# Patient Record
Sex: MALE | Race: WHITE | NOT HISPANIC OR LATINO
[De-identification: names, ages, dates, MRNs, and addresses within clinical notes are randomized per-mention and may not be internally consistent; named-entity substitution may affect disease eponyms.]

---

## 2024-03-28 PROBLEM — Z00.00 ENCOUNTER FOR PREVENTIVE HEALTH EXAMINATION: Status: ACTIVE | Noted: 2024-03-28

## 2024-04-04 ENCOUNTER — NON-APPOINTMENT (OUTPATIENT)
Age: 59
End: 2024-04-04

## 2024-04-04 ENCOUNTER — APPOINTMENT (OUTPATIENT)
Dept: HEART AND VASCULAR | Facility: CLINIC | Age: 59
End: 2024-04-04
Payer: COMMERCIAL

## 2024-04-04 VITALS
SYSTOLIC BLOOD PRESSURE: 128 MMHG | TEMPERATURE: 97.9 F | HEIGHT: 73.5 IN | OXYGEN SATURATION: 99 % | BODY MASS INDEX: 24.26 KG/M2 | WEIGHT: 187 LBS | RESPIRATION RATE: 16 BRPM | HEART RATE: 49 BPM | DIASTOLIC BLOOD PRESSURE: 72 MMHG

## 2024-04-04 PROCEDURE — 93000 ELECTROCARDIOGRAM COMPLETE: CPT

## 2024-04-04 PROCEDURE — 99203 OFFICE O/P NEW LOW 30 MIN: CPT | Mod: 25

## 2024-04-04 RX ORDER — FLECAINIDE ACETATE 100 MG/1
100 TABLET ORAL
Qty: 180 | Refills: 3 | Status: ACTIVE | COMMUNITY
Start: 2024-04-04 | End: 1900-01-01

## 2024-04-04 NOTE — HISTORY OF PRESENT ILLNESS
[FreeTextEntry1] : Robi Griggs was seen today at the [facility]. Hei s a 58y.o.malehere for routine follow up. .He suffers from atrial fibrillation and atrial flutter. He transitioned from paroxysmal AF to persistent AF sometime in 2018.After reportinga change in his exercise tolerance and a feeling of fatigue he underwent DCCV with adjunctive Flecainide therapy in July 2022.Kardia monitor post cardioversion documented paroxysmal atrial fibrillation and atrial flutter with controlled ventricular rates. Today he reports that he has been in sinus rhythm on the Kardia monitor most of the time but has had some episodes of symptomatic AF.  He continues to take Flecainide and feels well. He recently underwent Lipoprotein a testing and will be seeing a general cardiologist for consultation in the coming weeks.  Past Medical History Vitiligo  No Known Allergies   Current Outpatient Medications  flecainide (TAMBOCOR)  100 mg, oral, every 12 hours   ECG sinus bradycardia normal intervals  HEENT- NCAT Constitutional: WN WD WF NAD Eyes: Sclera white. PERRLA. EOMI. ENMT: No obvious oral lesions. Oropharynx clear. No palpable neck masses. Extremities: No C/C/E. CV: No JVD or carotid bruit. PMI nl. S1S2 RRR No M/R/H/G Lungs: CTA & P Abd: +BS, NT/ND no HSM : Deferred Skin: no lesions or rash Neuro: A&Ox3, non focal, asymmetric smile (since birth) Psych: no abnl behaviors during exam  Exercise stress echocardiogram2/2023  negative for ischemia induced wall motion abnormality.  Stress ECG: Significant arrhythmia was seen, which consisted of transient second degree AV block mobitz I at recovery phase.  At peak stress ECG changes were seen, and consisted of ST depression in inferolateral leads. Return to baseline at the recovery phase.  Normal left ventricular wall motion and ejection fraction.  Mild right ventricular dilatation.  Normal right ventricular function.  Moderate tricuspid valve regurgitation.  Severe right atrial dilatation  LA dilatation LA size 5.2.  LVEF 60%  .

## 2024-04-04 NOTE — DISCUSSION/SUMMARY
[FreeTextEntry1] : Impression  1. Persistent Atrial fibrillation with worsening fatigue and worsening exercise tolerance, now in sinus rhythm status post DCCV on Flecainide therapy  2. CHADs Vasc = 0  3. Vitiligo  4. Elevated Lipoprotein a on screening  Plan  Mr. Griggs continues to do well on his current medical regimen. Review of recent echo demonstrates right atrial dilatation with mild to moderate TR. Will plan to repeat echo next visit. Follow up in six months. Follow up with general cardiology for elevated lipoprotein a.

## 2024-05-04 PROBLEM — I48.0 PAROXYSMAL ATRIAL FIBRILLATION: Status: RESOLVED | Noted: 2024-04-04 | Resolved: 2024-05-04

## 2024-05-08 ENCOUNTER — APPOINTMENT (OUTPATIENT)
Dept: HEART AND VASCULAR | Facility: CLINIC | Age: 59
End: 2024-05-08
Payer: COMMERCIAL

## 2024-05-08 ENCOUNTER — NON-APPOINTMENT (OUTPATIENT)
Age: 59
End: 2024-05-08

## 2024-05-08 VITALS
WEIGHT: 186 LBS | HEIGHT: 73.5 IN | OXYGEN SATURATION: 97 % | SYSTOLIC BLOOD PRESSURE: 118 MMHG | BODY MASS INDEX: 24.13 KG/M2 | HEART RATE: 58 BPM | DIASTOLIC BLOOD PRESSURE: 71 MMHG

## 2024-05-08 DIAGNOSIS — Z78.9 OTHER SPECIFIED HEALTH STATUS: ICD-10-CM

## 2024-05-08 DIAGNOSIS — I48.0 PAROXYSMAL ATRIAL FIBRILLATION: ICD-10-CM

## 2024-05-08 DIAGNOSIS — R06.09 OTHER FORMS OF DYSPNEA: ICD-10-CM

## 2024-05-08 DIAGNOSIS — E78.41 ELEVATED LIPOPROTEIN(A): ICD-10-CM

## 2024-05-08 DIAGNOSIS — I48.91 UNSPECIFIED ATRIAL FIBRILLATION: ICD-10-CM

## 2024-05-08 PROCEDURE — 99205 OFFICE O/P NEW HI 60 MIN: CPT

## 2024-05-08 PROCEDURE — G2211 COMPLEX E/M VISIT ADD ON: CPT

## 2024-05-08 PROCEDURE — 93000 ELECTROCARDIOGRAM COMPLETE: CPT

## 2024-05-09 ENCOUNTER — TRANSCRIPTION ENCOUNTER (OUTPATIENT)
Age: 59
End: 2024-05-09

## 2024-05-09 LAB
ALBUMIN SERPL ELPH-MCNC: 4.7 G/DL
ALP BLD-CCNC: 78 U/L
ALT SERPL-CCNC: 20 U/L
ANION GAP SERPL CALC-SCNC: 11 MMOL/L
AST SERPL-CCNC: 22 U/L
BASOPHILS # BLD AUTO: 0.07 K/UL
BASOPHILS NFR BLD AUTO: 1.2 %
BILIRUB SERPL-MCNC: 0.8 MG/DL
BUN SERPL-MCNC: 20 MG/DL
CALCIUM SERPL-MCNC: 10 MG/DL
CHLORIDE SERPL-SCNC: 103 MMOL/L
CHOLEST SERPL-MCNC: 224 MG/DL
CO2 SERPL-SCNC: 27 MMOL/L
CREAT SERPL-MCNC: 0.97 MG/DL
EGFR: 90 ML/MIN/1.73M2
EOSINOPHIL # BLD AUTO: 0.07 K/UL
EOSINOPHIL NFR BLD AUTO: 1.2 %
ESTIMATED AVERAGE GLUCOSE: 103 MG/DL
GLUCOSE SERPL-MCNC: 92 MG/DL
HBA1C MFR BLD HPLC: 5.2 %
HCT VFR BLD CALC: 45.2 %
HDLC SERPL-MCNC: 91 MG/DL
HGB BLD-MCNC: 15 G/DL
IMM GRANULOCYTES NFR BLD AUTO: 0.3 %
LDLC SERPL DIRECT ASSAY-MCNC: 120 MG/DL
LYMPHOCYTES # BLD AUTO: 1.63 K/UL
LYMPHOCYTES NFR BLD AUTO: 26.9 %
MAN DIFF?: NORMAL
MCHC RBC-ENTMCNC: 32.6 PG
MCHC RBC-ENTMCNC: 33.2 GM/DL
MCV RBC AUTO: 98.3 FL
MONOCYTES # BLD AUTO: 0.53 K/UL
MONOCYTES NFR BLD AUTO: 8.7 %
NEUTROPHILS # BLD AUTO: 3.74 K/UL
NEUTROPHILS NFR BLD AUTO: 61.7 %
PLATELET # BLD AUTO: 196 K/UL
POTASSIUM SERPL-SCNC: 5 MMOL/L
PROT SERPL-MCNC: 6.6 G/DL
RBC # BLD: 4.6 M/UL
RBC # FLD: 13.4 %
SODIUM SERPL-SCNC: 140 MMOL/L
TRIGL SERPL-MCNC: 125 MG/DL
TSH SERPL-ACNC: 0.69 UIU/ML
WBC # FLD AUTO: 6.06 K/UL

## 2024-05-10 ENCOUNTER — TRANSCRIPTION ENCOUNTER (OUTPATIENT)
Age: 59
End: 2024-05-10

## 2024-05-10 LAB — APO LP(A) SERPL-MCNC: 140.5 NMOL/L

## 2024-05-14 ENCOUNTER — TRANSCRIPTION ENCOUNTER (OUTPATIENT)
Age: 59
End: 2024-05-14

## 2024-05-21 ENCOUNTER — RESULT REVIEW (OUTPATIENT)
Age: 59
End: 2024-05-21

## 2024-05-22 ENCOUNTER — NON-APPOINTMENT (OUTPATIENT)
Age: 59
End: 2024-05-22

## 2024-05-22 NOTE — HISTORY OF PRESENT ILLNESS
[FreeTextEntry1] : Mr. Griggs presents for initial evaluation and management of paroxysmal atrial fibrillation, dyslipidemia with elevated Lp(a), and vitiligo.  He is following with a heart rhythm specialist, Chad Wang MD, and is pursuing a rhythm control strategy.  He underwent DCCV in July 2022 and is being maintained on flecainide. He has not been maintained on systemic anticoagulation.   He monitors his rhythm with a portable cardiac rhythm device (AliveCor by [employer]) and notes brief, infrequent paroxysms of atrial fibrillation.  In February 2024, he had lab work and was noted to have an elevated lipoprotein a of 135.  We had an extensive discussion about the elevated Lp(a) result and its relation to ASCVD and stroke. I explained the need for screening first degree relatives for elevated Lp(a) given its genetic etiology and for pursuing optimal LDL targets. I also explained that, at this time, the only commercially available agents with an effect on lowering Lp(a) are the PCSK9 inhibitors.  He feels generally well with complaints of occasional palpitations and HERNANDES to multiple flights of stairs. On 05/21/24, he had a CCTA which revealed a CAC 25 (LAD 25), mLAD mild-to-moderate stenosis, left breast retroaerolar surgical clip, and hiatal hernia.

## 2024-05-22 NOTE — REASON FOR VISIT
[Other: ____] : [unfilled] [FreeTextEntry1] : ======================================================================= Diagnostic Tests: -------------------------------------------------------------- EC24: sinus bradycardia at 47 bpm, first degree AV block.  24: sinus bradycardia at 47 bpm, inferolateral early repolarization abnormalities.  -------------------------------------------------------------- Stress tests: 23: exercise stress echo: EF 60%, normal wall motion, PA pressures, and ECG post 10.1 METs of exercise. -------------------------------------------------------------- EP procedures: 22: DCCV from AF to SR.  -------------------------------------------------------------- CT: 24: CCTA: CAC 25 (LAD 25), mLAD mild-to-moderate stenosis, left breast retroaerolar surgical clip, hiatal hernia.

## 2024-05-22 NOTE — ASSESSMENT
[FreeTextEntry1] : ======================================================================================= 1. Atrial fibrillation, paroxysmal: [id number] Score: [id number] Score 0:    - follow up with heart rhythm specialist, Chad Wang MD    - continue to pursue rhythm control strategy with flecainide 100mg po bid    - there is no indication for systemic anticoagulation at this time      - we discussed the relationship of alcohol to atrial fibrillation burden, highlighting the correlation down to zero intake    - he will provide me with the report from his prior home sleep study  2. Dyslipidemia: elevated Lp(a):    - will initiate Repatha 140mg sc q 2 week given a 15-20% possible reduction in Lp(a)    - check lab work today    - will initiate targeted therapy against Lp(a) when available (pelacarsen, olpasiran, or zerlasiran)  3. CAD: s/p 05/21/24: CCTA: CAC 25 (LAD 25), mLAD mild-to-moderate stenosis, left breast retroaerolar surgical clip, hiatal hernia:    - will pursue aggressive medical management    - will not initiate antiplatelet therapy secondary to active GERD and esophagitis   I spent > 45 minutes of total time on this visit, including time spent face-to-face and non-face-to-face.  During this time, I took a relevant history and examined the patient.  I reviewed relevant portions of the medical record and formulated a differential diagnosis and plan.  I explained the relevant cardiac diagnoses to the patient, as well as the work up and management plan.  I answered all questions related to the patient's cardiac conditions.

## 2024-05-23 ENCOUNTER — RESULT REVIEW (OUTPATIENT)
Age: 59
End: 2024-05-23

## 2024-05-23 DIAGNOSIS — E78.5 HYPERLIPIDEMIA, UNSPECIFIED: ICD-10-CM

## 2024-05-23 RX ORDER — ROSUVASTATIN CALCIUM 10 MG/1
10 TABLET, FILM COATED ORAL DAILY
Qty: 90 | Refills: 3 | Status: ACTIVE | COMMUNITY
Start: 2024-05-23 | End: 1900-01-01

## 2024-05-23 NOTE — REASON FOR VISIT
[Other: ____] : [unfilled] [FreeTextEntry1] : ======================================================================= Diagnostic Tests: -------------------------------------------------------------- EC24: sinus bradycardia at 47 bpm, first degree AV block.  24: sinus bradycardia at 47 bpm, inferolateral early repolarization abnormalities.  -------------------------------------------------------------- Stress tests: 23: exercise stress echo: EF 60%, normal wall motion, PA pressures, and ECG post 10.1 METs of exercise. -------------------------------------------------------------- EP procedures: 22: DCCV from AF to SR.

## 2024-05-23 NOTE — HISTORY OF PRESENT ILLNESS
[FreeTextEntry1] : Mr. Griggs presents for initial evaluation and management of paroxysmal atrial fibrillation, dyslipidemia with elevated Lp(a), and vitiligo.  He is following with a heart rhythm specialist, Chad Wang MD, and is pursuing a rhythm control strategy.  He underwent DCCV in July 2022 and is being maintained on flecainide. He has not been maintained on systemic anticoagulation.   He monitors his rhythm with a portable cardiac rhythm device (AliveCor by [employer]) and notes brief, infrequent paroxysms of atrial fibrillation.  In February 2024, he had lab work and was noted to have an elevated lipoprotein a of 135.  We had an extensive discussion about the elevated Lp(a) result and its relation to ASCVD and stroke. I explained the need for screening first degree relatives for elevated Lp(a) given its genetic etiology and for pursuing optimal LDL targets. I also explained that, at this time, the only commercially available agents with an effect on lowering Lp(a) are the PCSK9 inhibitors.  He feels generally well with complaints of occasional palpitations and HERNANDES to multiple flights of stairs.

## 2024-06-26 ENCOUNTER — TRANSCRIPTION ENCOUNTER (OUTPATIENT)
Age: 59
End: 2024-06-26

## 2024-06-26 RX ORDER — EVOLOCUMAB 140 MG/ML
140 INJECTION, SOLUTION SUBCUTANEOUS
Qty: 6 | Refills: 3 | Status: ACTIVE | COMMUNITY
Start: 2024-05-08 | End: 1900-01-01

## 2024-09-25 ENCOUNTER — NON-APPOINTMENT (OUTPATIENT)
Age: 59
End: 2024-09-25

## 2024-09-25 ENCOUNTER — APPOINTMENT (OUTPATIENT)
Dept: HEART AND VASCULAR | Facility: CLINIC | Age: 59
End: 2024-09-25
Payer: COMMERCIAL

## 2024-09-25 VITALS
HEART RATE: 58 BPM | DIASTOLIC BLOOD PRESSURE: 72 MMHG | SYSTOLIC BLOOD PRESSURE: 107 MMHG | HEIGHT: 73.5 IN | BODY MASS INDEX: 24.95 KG/M2 | OXYGEN SATURATION: 97 % | WEIGHT: 192.38 LBS

## 2024-09-25 DIAGNOSIS — E78.41 ELEVATED LIPOPROTEIN(A): ICD-10-CM

## 2024-09-25 DIAGNOSIS — I25.10 ATHEROSCLEROTIC HEART DISEASE OF NATIVE CORONARY ARTERY W/OUT ANGINA PECTORIS: ICD-10-CM

## 2024-09-25 DIAGNOSIS — R06.09 OTHER FORMS OF DYSPNEA: ICD-10-CM

## 2024-09-25 DIAGNOSIS — I48.91 UNSPECIFIED ATRIAL FIBRILLATION: ICD-10-CM

## 2024-09-25 PROCEDURE — G2211 COMPLEX E/M VISIT ADD ON: CPT | Mod: NC

## 2024-09-25 PROCEDURE — 99214 OFFICE O/P EST MOD 30 MIN: CPT

## 2024-09-25 PROCEDURE — 93000 ELECTROCARDIOGRAM COMPLETE: CPT

## 2024-09-25 NOTE — HISTORY OF PRESENT ILLNESS
[FreeTextEntry1] : Mr. Griggs presents for follow up and management of CAD, paroxysmal atrial fibrillation, dyslipidemia with elevated Lp(a), and vitiligo.  He is following with a heart rhythm specialist, Chad Wang MD, and is pursuing a rhythm control strategy.  He underwent DCCV in July 2022 and is being maintained on flecainide. He has not been maintained on systemic anticoagulation.   He monitors his rhythm with a portable cardiac rhythm device (AliveCor by [employer]) and notes brief, infrequent paroxysms of atrial fibrillation.  In February 2024, he had lab work and was noted to have an elevated lipoprotein a of 135.  We had an extensive discussion about the elevated Lp(a) result and its relation to ASCVD and stroke. I explained the need for screening first degree relatives for elevated Lp(a) given its genetic etiology and for pursuing optimal LDL targets. I also explained that, at this time, the only commercially available agents with an effect on lowering Lp(a) are the PCSK9 inhibitors.  He feels generally well with complaints of occasional palpitations and HERNANDES to multiple flights of stairs. On 05/21/24, he had a CCTA which revealed a CAC score of 25 (LAD 25), mLAD mild-to-moderate stenosis, left breast retroaerolar surgical clip, and hiatal hernia.  He is tolerating Repatha well and stopped his rosuvastatin.  He will be traveling to [facility] in two weeks.

## 2024-09-25 NOTE — ASSESSMENT
[FreeTextEntry1] : ======================================================================================= 1. Atrial fibrillation, paroxysmal: [id number] Score: [id number] Score 0:    - follow up with heart rhythm specialist, Chad Wang MD    - continue to pursue rhythm control strategy with flecainide 100mg po bid    - there is no indication for systemic anticoagulation at this time      - we discussed the relationship of alcohol to atrial fibrillation burden, highlighting the correlation down to zero intake    - he will provide me with the report from his prior home sleep study  2. Dyslipidemia: elevated Lp(a) 140 nmol/liter (05/08/24),  (05/08/24):     - consider reinitiating rosuvastatin 10mg po qd post review of today's lab work     - continue Repatha 140mg sc q 2 weeks (employed for its effect on LP(a))    - check lab work today    - will initiate targeted therapy against Lp(a) when available (pelacarsen, olpasiran, or zerlasiran)  3. CAD: s/p 05/21/24: CCTA: CAC 25 (LAD 25), mLAD mild-to-moderate stenosis, left breast retroaerolar surgical clip, hiatal hernia:    - will pursue aggressive medical management    - will not initiate antiplatelet therapy secondary to active GERD and esophagitis    - will follow left ventricular function with periodic echocardiograms

## 2024-09-25 NOTE — HISTORY OF PRESENT ILLNESS
[FreeTextEntry1] : Mr. Griggs presents for follow up and management of CAD, paroxysmal atrial fibrillation, dyslipidemia with elevated Lp(a), and vitiligo.  He is following with a heart rhythm specialist, Chad Wang MD, and is pursuing a rhythm control strategy.  He underwent DCCV in July 2022 and is being maintained on flecainide. He has not been maintained on systemic anticoagulation.   He monitors his rhythm with a portable cardiac rhythm device (AliveCor by [employer]) and notes brief, infrequent paroxysms of atrial fibrillation.  In February 2024, he had lab work and was noted to have an elevated lipoprotein a of 135.  We had an extensive discussion about the elevated Lp(a) result and its relation to ASCVD and stroke. I explained the need for screening first degree relatives for elevated Lp(a) given its genetic etiology and for pursuing optimal LDL targets. I also explained that, at this time, the only commercially available agents with an effect on lowering Lp(a) are the PCSK9 inhibitors.  He feels generally well with complaints of occasional palpitations and HERNANDES to multiple flights of stairs. On 05/21/24, he had a CCTA which revealed a CAC score of 25 (LAD 25), mLAD mild-to-moderate stenosis, left breast retroaerolar surgical clip, and hiatal hernia.  He is tolerating Repatha well and stopped his rosuvastatin.  He will be traveling to [facility] in two weeks.

## 2024-09-25 NOTE — REASON FOR VISIT
[FreeTextEntry1] : ======================================================================= Diagnostic Tests: -------------------------------------------------------------- EC24: sinus bradycardia at 56 bpm, first degree AV block.  24: sinus bradycardia at 47 bpm, first degree AV block.  24: sinus bradycardia at 47 bpm, inferolateral early repolarization abnormalities.  -------------------------------------------------------------- Stress tests: 23: exercise stress echo: EF 60%, normal wall motion, PA pressures, and ECG post 10.1 METs of exercise. -------------------------------------------------------------- EP procedures: 22: DCCV from AF to SR.  -------------------------------------------------------------- CT: 24: CCTA: CAC 25 (LAD 25), mLAD mild-to-moderate stenosis, left breast retroaerolar surgical clip, hiatal hernia.

## 2024-09-25 NOTE — ASSESSMENT
[FreeTextEntry1] : ======================================================================================= 1. Atrial fibrillation, paroxysmal: [id number] Score: [id number] Score 0:    - follow up with heart rhythm specialist, Chad Wang MD    - continue to pursue rhythm control strategy with flecainide 100mg po bid    - there is no indication for systemic anticoagulation at this time      - we discussed the relationship of alcohol to atrial fibrillation burden, highlighting the correlation down to zero intake    - he will provide me with the report from his prior home sleep study  2. Dyslipidemia: elevated Lp(a) 140 nmol/liter (05/08/24),  (05/08/24):     - consider reinitiating rosuvastatin 10mg po qd post review of today's lab work     - continue Repatha 140mg sc q 2 weeks (employed for its effect on LP(a))    - check lab work today    - will initiate targeted therapy against Lp(a) when available (pelacarsen, olpasiran, or zerlasiran)  3. CAD: s/p 05/21/24: CCTA: CAC 25 (LAD 25), mLAD mild-to-moderate stenosis, left breast retroaerolar surgical clip, hiatal hernia:    - will pursue aggressive medical management    - will not initiate antiplatelet therapy secondary to active GERD and esophagitis    - will follow left ventricular function with periodic echocardiograms

## 2024-09-26 ENCOUNTER — APPOINTMENT (OUTPATIENT)
Dept: HEART AND VASCULAR | Facility: CLINIC | Age: 59
End: 2024-09-26
Payer: COMMERCIAL

## 2024-09-26 VITALS
HEIGHT: 72.05 IN | BODY MASS INDEX: 26.01 KG/M2 | HEART RATE: 54 BPM | RESPIRATION RATE: 16 BRPM | WEIGHT: 192 LBS | OXYGEN SATURATION: 97 % | TEMPERATURE: 97.9 F | SYSTOLIC BLOOD PRESSURE: 108 MMHG | DIASTOLIC BLOOD PRESSURE: 72 MMHG

## 2024-09-26 PROCEDURE — G2211 COMPLEX E/M VISIT ADD ON: CPT | Mod: NC

## 2024-09-26 PROCEDURE — 99214 OFFICE O/P EST MOD 30 MIN: CPT

## 2024-09-26 NOTE — HISTORY OF PRESENT ILLNESS
[FreeTextEntry1] : Robi Griggs was seen today at the [facility]. Hei s a 58 y.o.gentleman here for routine follow up. .He suffers from atrial fibrillation and atrial flutter. He transitioned from paroxysmal AF to persistent AF sometime in 2018.After reporting a change in his exercise tolerance and a feeling of fatigue he underwent DCCV with adjunctive Flecainide therapy in July 2022.  Kardia monitor post cardioversion documented paroxysmal atrial fibrillation and atrial flutter with controlled ventricular rates. Today he reports an increase in frequency in his episodes.  He recently underwent left knee meniscus repair and since surgery he has noted more symptoms.  Today he feels well.   Past Medical History Elevated Lipoprotein a, CT Calcium score of 25  Vitiligo  No Known Allergies   Current Outpatient Medications  flecainide (TAMBOCOR)  100 mg, oral, every 12 hours  Repatha 140mg/ml every two weeks  HEENT- NCAT Constitutional: WN WD WF NAD Eyes: Sclera white. PERRLA. EOMI. ENMT: No obvious oral lesions. Oropharynx clear. No palpable neck masses. Extremities: No C/C/E. CV: No JVD or carotid bruit. PMI nl. S1S2 RRR No M/R/H/G Lungs: CTA & P Abd: +BS, NT/ND no HSM : Deferred Skin: no lesions or rash Neuro: A&Ox3, non focal, asymmetric smile (since birth) Psych: no abnl behaviors during exam  ECG sinus bradycardia normal intervals  CTA with contrast- no coronary artery obstructions.  CAC score 25.  Exercise stress echocardiogram2/2023  negative for ischemia induced wall motion abnormality.  Stress ECG: Significant arrhythmia was seen, which consisted of transient second degree AV block mobitz I at recovery phase.  At peak stress ECG changes were seen, and consisted of ST depression in inferolateral leads. Return to baseline at the recovery phase.  Normal left ventricular wall motion and ejection fraction.  Mild right ventricular dilatation.  Normal right ventricular function.  Moderate tricuspid valve regurgitation.  Severe right atrial dilatation  LA dilatation LA size 5.2.  LVEF 60%  .

## 2024-09-26 NOTE — DISCUSSION/SUMMARY
[EKG obtained to assist in diagnosis and management of assessed problem(s)] : EKG obtained to assist in diagnosis and management of assessed problem(s) [FreeTextEntry1] : Impression  1. History of persistent Atrial fibrillation with worsening fatigue and worsening exercise tolerance, now in sinus rhythm status post DCCV on Flecainide therapy with increased AF burden following knee surgery 2. CHADs Vasc = 0  3. Vitiligo  4. Elevated Lipoprotein  a  now on PCSK9 5. RA dilatation and moderate TR   Plan  Mr. Griggs continues to do well on his current medical regimen but notes an increased frequency in AF episodes.   I reviewed the process of  left atrial catheter ablation with Mr SEAY and his wife.   We have discussed this several times in the past and he would like to hold off on any intervention at this time.   Will order TTE to assess tricuspid valve and RA dilatation.  Follow up in January or sooner PRN

## 2024-09-27 LAB
ALBUMIN SERPL ELPH-MCNC: 4.5 G/DL
ALP BLD-CCNC: 77 U/L
ALT SERPL-CCNC: 17 U/L
ANION GAP SERPL CALC-SCNC: 15 MMOL/L
APO LP(A) SERPL-MCNC: 110.5 NMOL/L
AST SERPL-CCNC: 22 U/L
BASOPHILS # BLD AUTO: 0.05 K/UL
BASOPHILS NFR BLD AUTO: 1 %
BILIRUB SERPL-MCNC: 0.5 MG/DL
BUN SERPL-MCNC: 17 MG/DL
CALCIUM SERPL-MCNC: 9.4 MG/DL
CHLORIDE SERPL-SCNC: 105 MMOL/L
CHOLEST SERPL-MCNC: 150 MG/DL
CO2 SERPL-SCNC: 22 MMOL/L
CREAT SERPL-MCNC: 0.89 MG/DL
EGFR: 99 ML/MIN/1.73M2
EOSINOPHIL # BLD AUTO: 0.05 K/UL
EOSINOPHIL NFR BLD AUTO: 1 %
ESTIMATED AVERAGE GLUCOSE: 100 MG/DL
GLUCOSE SERPL-MCNC: 96 MG/DL
HBA1C MFR BLD HPLC: 5.1 %
HCT VFR BLD CALC: 45.1 %
HDLC SERPL-MCNC: 83 MG/DL
HGB BLD-MCNC: 14.6 G/DL
IMM GRANULOCYTES NFR BLD AUTO: 0.2 %
LDLC SERPL DIRECT ASSAY-MCNC: 66 MG/DL
LYMPHOCYTES # BLD AUTO: 1.4 K/UL
LYMPHOCYTES NFR BLD AUTO: 28.1 %
MAN DIFF?: NORMAL
MCHC RBC-ENTMCNC: 32.2 PG
MCHC RBC-ENTMCNC: 32.4 GM/DL
MCV RBC AUTO: 99.3 FL
MONOCYTES # BLD AUTO: 0.45 K/UL
MONOCYTES NFR BLD AUTO: 9 %
NEUTROPHILS # BLD AUTO: 3.02 K/UL
NEUTROPHILS NFR BLD AUTO: 60.7 %
PLATELET # BLD AUTO: 184 K/UL
POTASSIUM SERPL-SCNC: 4.5 MMOL/L
PROT SERPL-MCNC: 6.2 G/DL
RBC # BLD: 4.54 M/UL
RBC # FLD: 13.5 %
SODIUM SERPL-SCNC: 142 MMOL/L
TRIGL SERPL-MCNC: 37 MG/DL
TSH SERPL-ACNC: 0.8 UIU/ML
WBC # FLD AUTO: 4.98 K/UL

## 2024-11-05 ENCOUNTER — APPOINTMENT (OUTPATIENT)
Dept: CARDIOLOGY | Facility: CLINIC | Age: 59
End: 2024-11-05
Payer: COMMERCIAL

## 2024-11-05 PROCEDURE — 93306 TTE W/DOPPLER COMPLETE: CPT

## 2025-03-24 ENCOUNTER — NON-APPOINTMENT (OUTPATIENT)
Age: 60
End: 2025-03-24

## 2025-03-26 ENCOUNTER — APPOINTMENT (OUTPATIENT)
Dept: HEART AND VASCULAR | Facility: CLINIC | Age: 60
End: 2025-03-26
Payer: COMMERCIAL

## 2025-03-26 VITALS
OXYGEN SATURATION: 99 % | HEART RATE: 54 BPM | BODY MASS INDEX: 26.28 KG/M2 | TEMPERATURE: 98 F | HEIGHT: 72 IN | DIASTOLIC BLOOD PRESSURE: 64 MMHG | WEIGHT: 194 LBS | SYSTOLIC BLOOD PRESSURE: 100 MMHG

## 2025-03-26 DIAGNOSIS — E78.5 HYPERLIPIDEMIA, UNSPECIFIED: ICD-10-CM

## 2025-03-26 DIAGNOSIS — E78.41 ELEVATED LIPOPROTEIN(A): ICD-10-CM

## 2025-03-26 DIAGNOSIS — I25.10 ATHEROSCLEROTIC HEART DISEASE OF NATIVE CORONARY ARTERY W/OUT ANGINA PECTORIS: ICD-10-CM

## 2025-03-26 DIAGNOSIS — R06.09 OTHER FORMS OF DYSPNEA: ICD-10-CM

## 2025-03-26 PROCEDURE — G2211 COMPLEX E/M VISIT ADD ON: CPT | Mod: NC

## 2025-03-26 PROCEDURE — 99214 OFFICE O/P EST MOD 30 MIN: CPT

## 2025-04-02 ENCOUNTER — APPOINTMENT (OUTPATIENT)
Dept: HEART AND VASCULAR | Facility: CLINIC | Age: 60
End: 2025-04-02
Payer: COMMERCIAL

## 2025-04-02 ENCOUNTER — NON-APPOINTMENT (OUTPATIENT)
Age: 60
End: 2025-04-02

## 2025-04-02 VITALS
HEIGHT: 72 IN | BODY MASS INDEX: 26.28 KG/M2 | TEMPERATURE: 98 F | DIASTOLIC BLOOD PRESSURE: 74 MMHG | HEART RATE: 46 BPM | WEIGHT: 194 LBS | OXYGEN SATURATION: 96 % | SYSTOLIC BLOOD PRESSURE: 106 MMHG | RESPIRATION RATE: 16 BRPM

## 2025-04-02 DIAGNOSIS — I48.91 UNSPECIFIED ATRIAL FIBRILLATION: ICD-10-CM

## 2025-04-02 PROCEDURE — 93000 ELECTROCARDIOGRAM COMPLETE: CPT

## 2025-04-02 PROCEDURE — 99214 OFFICE O/P EST MOD 30 MIN: CPT

## 2025-04-02 PROCEDURE — G2211 COMPLEX E/M VISIT ADD ON: CPT | Mod: NC

## 2025-04-15 ENCOUNTER — TRANSCRIPTION ENCOUNTER (OUTPATIENT)
Age: 60
End: 2025-04-15

## 2025-04-16 ENCOUNTER — NON-APPOINTMENT (OUTPATIENT)
Age: 60
End: 2025-04-16